# Patient Record
Sex: MALE | URBAN - NONMETROPOLITAN AREA
[De-identification: names, ages, dates, MRNs, and addresses within clinical notes are randomized per-mention and may not be internally consistent; named-entity substitution may affect disease eponyms.]

---

## 2021-12-15 ENCOUNTER — OFFICE VISIT (OUTPATIENT)
Dept: URBAN - NONMETROPOLITAN AREA CLINIC 1 | Facility: CLINIC | Age: 76
End: 2021-12-15
Payer: MEDICARE

## 2021-12-15 DIAGNOSIS — E11.3291 TYPE 2 DIAB W MILD NONPRLF DIABETIC RTNOP W/O MACULAR EDEMA, RIGHT EYE: Primary | ICD-10-CM

## 2021-12-15 DIAGNOSIS — H25.13 AGE-RELATED NUCLEAR CATARACT, BILATERAL: ICD-10-CM

## 2021-12-15 PROCEDURE — 92082 INTERMEDIATE VISUAL FIELD XM: CPT | Performed by: OPTOMETRIST

## 2021-12-15 PROCEDURE — 99203 OFFICE O/P NEW LOW 30 MIN: CPT | Performed by: OPTOMETRIST

## 2021-12-15 ASSESSMENT — KERATOMETRY
OS: 42.00
OD: 42.25

## 2021-12-15 ASSESSMENT — INTRAOCULAR PRESSURE
OD: 16
OS: 21

## 2021-12-15 NOTE — IMPRESSION/PLAN
Impression: Type 2 diab w mild nonprlf diabetic rtnop w/o macular edema, right eye: [id number]. Plan: Educated pt on exam findings and mild NPDR OD. Educated pt on importance of tight diabetic (A1C target: <6.5%), blood pressure (goal: <130/80), and cholesterol control to decrease risk of retinopathy progression and further vision loss. Recommended pt continuing taking all medications as directed and follow up with PCP. Pt expressed understanding. RTC 1 year for dilated diabetic exam, or sooner if changes in vision or ocular comfort occur prior.

## 2021-12-15 NOTE — IMPRESSION/PLAN
Impression: Age-related nuclear cataract, bilateral: H25.13. Plan: Discussed cataract findings. Discussed option of ce/iol OU. Educated pt no improvement can be made with glasses at this time due to cataract density. SRx not finalized today. Recommended cataract surgery as well to improve view of posterior segment to monitor for diabetic changes. Patient desires to have surgery. Recommend CE w/IOL consult with Dr. Tarik Gonzalez or [facility].